# Patient Record
(demographics unavailable — no encounter records)

---

## 2024-10-14 NOTE — DISCUSSION/SUMMARY
[FreeTextEntry1] : Urine POCT negative for UTI Urine cx sent, if positive, will start antibiotic based on susceptibility

## 2024-10-14 NOTE — HISTORY OF PRESENT ILLNESS
[de-identified] : dad reports pt with burning with urination x 2 days, +UO, appetite OK, dad denies fever, NVD, abdominal pain, hematuria  [FreeTextEntry6] : Burning with urination and feeling urge to urinate more frequently yesterday Less burning today, less urge to urinate today Afebrile No abdominal pain, back pain, nausea, vomiting, diarrhea, constipation

## 2024-10-17 NOTE — DISCUSSION/SUMMARY
[FreeTextEntry1] : give cefadroxil due to positive nitrates, wbc continued dysuria and abdominal pain reviwed with mom recent urine culture Symptomatic treatment.  A urine culture was performed.   Send for dentification and sensitives if urine culture is positive. Await sensitivities of recent urine culture if worse follow up sooner. Supportive care Symptomatic treatment push fluids including cranberry juice water, ibuprofen as needed discussed no bubble baths, mom had reinforced gu hygiene and wiping at home Next visit recheck if worsening symptoms.

## 2024-10-17 NOTE — PHYSICAL EXAM
[TextEntry] : Gen: Awake, alert,  In no acute distress , well appearing Eyes: no periorbital swelling Ears :   right Tympanic Membrane:  Normal               left tympanic Membrane:  Normal Pharynx: no redness ,no sores, not inflamed  Neck supple Cardiac : normal rate, regular rhythm, S1,S2 normal, no murmur Lungs: clear to auscultation no cva tenderness bilateral abdomen soft non tender no hepatosplenomegaly normal female external genitalia anup 1 , no erythema

## 2024-10-17 NOTE — HISTORY OF PRESENT ILLNESS
[de-identified] : seen earlier this week for possible uti. still burning while urinating and stomach pain [FreeTextEntry6] : FLY is here today for follow up dysuria and lower abdominal pain with urination no fever Reviewed last office visit 10/14 KM had called mom today advised repeat urine test as ecoli preliminary 10 to 40,000 cfu sample was in refrigerator over night no back pain no history past UTI no vaginal redness no back pain denies sore throat wears pull ups underwear under history recent strep throat 10/1

## 2024-11-20 NOTE — HISTORY OF PRESENT ILLNESS
[Mother] : mother [Vitamins] : takes vitamins  [Normal] : Normal [Brushing teeth twice/d] : brushing teeth twice per day [Flossing teeth] : flossing teeth [Yes] : Patient goes to dentist yearly [Toothpaste] : Primary Fluoride Source: Toothpaste [Playtime (60 min/d)] : playtime 60 min a day [Participates in after-school activities] : participates in after-school activities [Appropiate parent-child-sibling interaction] : appropriate parent-child-sibling interaction [Has Friends] : has friends [Grade ___] : Grade [unfilled] [Adequate social interactions] : adequate social interactions [Adequate behavior] : adequate behavior [Adequate performance] : adequate performance [Adequate attention] : adequate attention [No difficulties with Homework] : no difficulties with homework [No] : No cigarette smoke exposure [Appropriately restrained in motor vehicle] : appropriately restrained in motor vehicle [Supervised outdoor play] : supervised outdoor play [Supervised around water] : supervised around water [Wears helmet and pads] : wears helmet and pads [Parent knows child's friends] : parent knows child's friends [Parent discusses safety rules regarding adults] : parent discusses safety rules regarding adults [Family discusses home emergency plan] : family discusses home emergency plan [Up to date] : Up to date [Exposure to electronic nicotine delivery system] : No exposure to electronic nicotine delivery system [FreeTextEntry7] : 8yr well visit  [de-identified] : multivitamin, eats well  [FreeTextEntry9] : tennis, piano, and acting  [FreeTextEntry1] : No specialists

## 2024-11-20 NOTE — HISTORY OF PRESENT ILLNESS
[Mother] : mother [Vitamins] : takes vitamins  [Normal] : Normal [Brushing teeth twice/d] : brushing teeth twice per day [Flossing teeth] : flossing teeth [Yes] : Patient goes to dentist yearly [Toothpaste] : Primary Fluoride Source: Toothpaste [Playtime (60 min/d)] : playtime 60 min a day [Participates in after-school activities] : participates in after-school activities [Appropiate parent-child-sibling interaction] : appropriate parent-child-sibling interaction [Has Friends] : has friends [Grade ___] : Grade [unfilled] [Adequate social interactions] : adequate social interactions [Adequate behavior] : adequate behavior [Adequate performance] : adequate performance [Adequate attention] : adequate attention [No difficulties with Homework] : no difficulties with homework [No] : No cigarette smoke exposure [Appropriately restrained in motor vehicle] : appropriately restrained in motor vehicle [Supervised outdoor play] : supervised outdoor play [Supervised around water] : supervised around water [Wears helmet and pads] : wears helmet and pads [Parent knows child's friends] : parent knows child's friends [Parent discusses safety rules regarding adults] : parent discusses safety rules regarding adults [Family discusses home emergency plan] : family discusses home emergency plan [Up to date] : Up to date [Exposure to electronic nicotine delivery system] : No exposure to electronic nicotine delivery system [FreeTextEntry7] : 8yr well visit  [de-identified] : multivitamin, eats well  [FreeTextEntry9] : tennis, piano, and acting  [FreeTextEntry1] : No specialists

## 2024-11-20 NOTE — PLAN
[TextEntry] : The following anticipatory guidance topics were discussed and/or handouts given: school readiness, mental health, nutrition and physical activity, oral health and safety. Counseling for nutrition and physical activity was provided. Anticipatory guidance addressed as per the history of present illness section. Flu shot given   SCHOOL READINESS: Discussed structured learning experiences, opportunities to socialize with other children, fears, friends, fluency.   HEALTHY PERSONAL HABITS: Discussed developing healthy personal habits (daily routines that promote health).   CHILD/FAMILY COMMUNITY INVOLVEMENT: Discussed child and family involvement and safety in the community - activities outside of the home, community projects, educational programs, relating to peers and adults.   PHYSICAL ACTIVITY: Discussed television/media, limits on viewing, promotion of physical activity and safe play.   DENTAL: Discussed visit with dentist.   SAFETY: Discussed belt positioning, booster seats, supervision, outdoor/pool safety.    5-2-1-0 questionnaire reviewed, parent(s) have no issues or concerns.   Return for next routine visit or sooner if questions/concerns arise.

## 2025-03-12 NOTE — DISCUSSION/SUMMARY
[FreeTextEntry1] : Symptomatic treatment advised Maintain adequate hydration Cool mist humidifier Saline nose drops Stressed handwashing and infection control Pay close observation for new or worsening symptoms Instructed to return to office if condition worsens or new symptoms arise Go to ER or UC if condition worsens or unable to to get to the office or after office hours Recheck prn

## 2025-03-12 NOTE — PHYSICAL EXAM
[Clear Rhinorrhea] : clear rhinorrhea [NL] : warm, clear [Conjuctival Injection] : no conjunctival injection [Discharge] : no discharge [Erythema] : no erythema [Bulging] : not bulging [Vesicles] : no vesicles [Exudate] : no exudate [Ulcerative Lesions] : no ulcerative lesions [Palate petechiae] : palate without petechiae

## 2025-03-12 NOTE — HISTORY OF PRESENT ILLNESS
[de-identified] : ear pain, afebrile [FreeTextEntry6] : Ear pain today Slight cough and runny nose today No fever or temp > 100 No sore throat No wheezing or dyspnea Normal appetite, No vomiting, No diarrhea No sick contacts No Covid contacts or exposure No recent travel or contact with travelers

## 2025-03-12 NOTE — REVIEW OF SYSTEMS
[Fever] : fever [Ear Pain] : ear pain [Nasal Congestion] : nasal congestion [Sore Throat] : sore throat [Negative] : Genitourinary [Malaise] : no malaise [Nasal Discharge] : no nasal discharge [Cyanosis] : no cyanosis [Tachypnea] : not tachypneic [Wheezing] : no wheezing [Cough] : no cough [Vomiting] : no vomiting [Diarrhea] : no diarrhea